# Patient Record
Sex: MALE | Race: WHITE | Employment: FULL TIME | URBAN - METROPOLITAN AREA
[De-identification: names, ages, dates, MRNs, and addresses within clinical notes are randomized per-mention and may not be internally consistent; named-entity substitution may affect disease eponyms.]

---

## 2021-05-14 ENCOUNTER — OFFICE VISIT (OUTPATIENT)
Dept: FAMILY MEDICINE CLINIC | Age: 26
End: 2021-05-14
Payer: COMMERCIAL

## 2021-05-14 VITALS
TEMPERATURE: 97.7 F | DIASTOLIC BLOOD PRESSURE: 54 MMHG | BODY MASS INDEX: 21 KG/M2 | SYSTOLIC BLOOD PRESSURE: 104 MMHG | HEART RATE: 83 BPM | OXYGEN SATURATION: 98 % | HEIGHT: 64 IN | WEIGHT: 123 LBS

## 2021-05-14 DIAGNOSIS — Z00.00 ANNUAL PHYSICAL EXAM: Primary | ICD-10-CM

## 2021-05-14 DIAGNOSIS — T78.40XD ALLERGIC REACTION, SUBSEQUENT ENCOUNTER: ICD-10-CM

## 2021-05-14 DIAGNOSIS — M25.511 CHRONIC PAIN OF BOTH SHOULDERS: ICD-10-CM

## 2021-05-14 DIAGNOSIS — M25.512 CHRONIC PAIN OF BOTH SHOULDERS: ICD-10-CM

## 2021-05-14 DIAGNOSIS — G89.29 CHRONIC PAIN OF BOTH SHOULDERS: ICD-10-CM

## 2021-05-14 DIAGNOSIS — Z23 IMMUNIZATION DUE: ICD-10-CM

## 2021-05-14 DIAGNOSIS — Z76.89 ENCOUNTER TO ESTABLISH CARE: ICD-10-CM

## 2021-05-14 PROCEDURE — 99385 PREV VISIT NEW AGE 18-39: CPT | Performed by: NURSE PRACTITIONER

## 2021-05-14 PROCEDURE — 96372 THER/PROPH/DIAG INJ SC/IM: CPT | Performed by: NURSE PRACTITIONER

## 2021-05-14 PROCEDURE — 90471 IMMUNIZATION ADMIN: CPT | Performed by: NURSE PRACTITIONER

## 2021-05-14 PROCEDURE — 99203 OFFICE O/P NEW LOW 30 MIN: CPT | Performed by: NURSE PRACTITIONER

## 2021-05-14 PROCEDURE — 90651 9VHPV VACCINE 2/3 DOSE IM: CPT | Performed by: NURSE PRACTITIONER

## 2021-05-14 RX ORDER — METHYLPREDNISOLONE ACETATE 80 MG/ML
80 INJECTION, SUSPENSION INTRA-ARTICULAR; INTRALESIONAL; INTRAMUSCULAR; SOFT TISSUE ONCE
Status: COMPLETED | OUTPATIENT
Start: 2021-05-14 | End: 2021-05-14

## 2021-05-14 RX ADMIN — METHYLPREDNISOLONE ACETATE 80 MG: 80 INJECTION, SUSPENSION INTRA-ARTICULAR; INTRALESIONAL; INTRAMUSCULAR; SOFT TISSUE at 11:11

## 2021-05-14 SDOH — ECONOMIC STABILITY: FOOD INSECURITY: WITHIN THE PAST 12 MONTHS, YOU WORRIED THAT YOUR FOOD WOULD RUN OUT BEFORE YOU GOT MONEY TO BUY MORE.: NEVER TRUE

## 2021-05-14 SDOH — ECONOMIC STABILITY: INCOME INSECURITY: HOW HARD IS IT FOR YOU TO PAY FOR THE VERY BASICS LIKE FOOD, HOUSING, MEDICAL CARE, AND HEATING?: NOT HARD AT ALL

## 2021-05-14 ASSESSMENT — PATIENT HEALTH QUESTIONNAIRE - PHQ9
SUM OF ALL RESPONSES TO PHQ9 QUESTIONS 1 & 2: 0
2. FEELING DOWN, DEPRESSED OR HOPELESS: 0
SUM OF ALL RESPONSES TO PHQ QUESTIONS 1-9: 0
SUM OF ALL RESPONSES TO PHQ QUESTIONS 1-9: 0

## 2021-05-14 NOTE — PATIENT INSTRUCTIONS
Patient Education        Well Visit, Ages 25 to 48: Care Instructions  Overview     Well visits can help you stay healthy. Your doctor has checked your overall health and may have suggested ways to take good care of yourself. Your doctor also may have recommended tests. At home, you can help prevent illness with healthy eating, regular exercise, and other steps. Follow-up care is a key part of your treatment and safety. Be sure to make and go to all appointments, and call your doctor if you are having problems. It's also a good idea to know your test results and keep a list of the medicines you take. How can you care for yourself at home? · Get screening tests that you and your doctor decide on. Screening helps find diseases before any symptoms appear. · Eat healthy foods. Choose fruits, vegetables, whole grains, protein, and low-fat dairy foods. Limit fat, especially saturated fat. Reduce salt in your diet. · Limit alcohol. If you are a man, have no more than 2 drinks a day or 14 drinks a week. If you are a woman, have no more than 1 drink a day or 7 drinks a week. · Get at least 30 minutes of physical activity on most days of the week. Walking is a good choice. You also may want to do other activities, such as running, swimming, cycling, or playing tennis or team sports. Discuss any changes in your exercise program with your doctor. · Reach and stay at a healthy weight. This will lower your risk for many problems, such as obesity, diabetes, heart disease, and high blood pressure. · Do not smoke or allow others to smoke around you. If you need help quitting, talk to your doctor about stop-smoking programs and medicines. These can increase your chances of quitting for good. · Care for your mental health. It is easy to get weighed down by worry and stress. Learn strategies to manage stress, like deep breathing and mindfulness, and stay connected with your family and community.  If you find you often feel sad or hopeless, talk with your doctor. Treatment can help. · Talk to your doctor about whether you have any risk factors for sexually transmitted infections (STIs). You can help prevent STIs if you wait to have sex with a new partner (or partners) until you've each been tested for STIs. It also helps if you use condoms (male or female condoms) and if you limit your sex partners to one person who only has sex with you. Vaccines are available for some STIs, such as HPV. · Use birth control if it's important to you to prevent pregnancy. Talk with your doctor about the choices available and what might be best for you. · If you think you may have a problem with alcohol or drug use, talk to your doctor. This includes prescription medicines (such as amphetamines and opioids) and illegal drugs (such as cocaine and methamphetamine). Your doctor can help you figure out what type of treatment is best for you. · Protect your skin from too much sun. When you're outdoors from 10 a.m. to 4 p.m., stay in the shade or cover up with clothing and a hat with a wide brim. Wear sunglasses that block UV rays. Even when it's cloudy, put broad-spectrum sunscreen (SPF 30 or higher) on any exposed skin. · See a dentist one or two times a year for checkups and to have your teeth cleaned. · Wear a seat belt in the car. When should you call for help? Watch closely for changes in your health, and be sure to contact your doctor if you have any problems or symptoms that concern you. Where can you learn more? Go to [email or web address] org and sign in to your [employer] account. Enter P072 in the [facility] [employer] box to learn more about \"Well Visit, Ages 25 to 48: Care Instructions. \"     If you do not have an account, please click on the \"Sign Up Now\" link. Current as of: May 27, 2020               Content Version: 12.8  © 9597-6100 Healthwise, Incorporated. Care instructions adapted under license by [facility] ([facility]). If you have questions about a medical condition or this instruction, always ask your healthcare professional. [place] [address] any warranty or liability for your use of this information.

## 2021-05-14 NOTE — PROGRESS NOTES
[facility]  [id number] [facility]. Johnny Awe  Layla Santacruz [address]  [phone]  [phone]    Kan Carolina is a 26 y.o. male who is here with c/o of:    Chief Complaint: [address] (no previous pcp) and Shoulder Pain (pain when working out and during the day, they also crack sxs for 2 years. works out 6 days a week and lifts everyday)      Patient Accompanied by: N/A    HPI - Kan Carolina is here today with c/o:    Patient here to establish care. Previous PCP: None since Pediatrician  : No  Children: No  Employed: [employer]  Exercise: Yes, daily  Diet: Eats a balanced diet  Smoker: Yes; <1 pack per day  Alcohol: Socially  Sleep: Averages 6 hours    Chronic Conditions:    None    Specialists:    None    Health Concerns:    He complains of bilateral shoulder pain that is aggravated by exercising. He describes pain as shooting with exercise and achy rest of day. He says this has been going on for the last 2 years. Does not take any OTC medication for the pain. He also does yoga to help. Patient also says he developed a rash this morning on his left arm. He says his back feelings itchy. Reports this happened one other time when he ate dairy. Says yesterday he had a protein shake and after that his stomach started hurting. Health Maintenance Due   Topic Date Due    HPV vaccine (1 - Male 2-dose series) Never done        There is no problem list on file for this patient. History reviewed. No pertinent past medical history. History reviewed. No pertinent surgical history. Family History   Problem Relation Age of Onset    Diabetes Father      Social History     Tobacco Use    Smoking status: Current Every Day Smoker     Packs/day: 0.50     Years: 8.00     Pack years: 4.00     Types: Cigarettes    Smokeless tobacco: Never Used   Substance Use Topics    Alcohol use:  Yes     Alcohol/week: 6.0 standard drinks     Types: 6 Shots of liquor per week     ALLERGIES: No Known Allergies       Subjective   Review of Systems   · Constitutional:  Negative for activity change, appetite change,unexpected weight change, chills, fever, and fatigue. · HENT: Negative for ear pain, sore throat,  Rhinorrhea, sinus pain, sinus pressure, congestion. · Eyes:  Negative for pain and discharge. · Respiratory:  Negative for chest tightness, shortness of breath, wheezing, and cough. · Cardiovascular:  Negative for chest pain, palpitations and leg swelling. · Gastrointestinal: Negative for abdominal pain, blood in stool, constipation,diarrhea, nausea and vomiting. · Endocrine: Negative for cold intolerance, heat intolerance, polydipsia, polyphagia and polyuria. · Genitourinary: Negative for difficulty urinating, dysuria, flank pain, frequency, hematuria and urgency. · Musculoskeletal: Negative for arthralgias, back pain, joint swelling, myalgias, neck pain and neck stiffness. + bilateral shoulder pain  · Skin: Negative for wound. + rash  · Allergic/Immunologic: Negative for environmental allergies and food allergies. · Neurological:  Negative for dizziness, light-headedness, numbness and headaches. · Hematological:  Negative for adenopathy. Does not bruise/bleed easily. · Psychiatric/Behavioral: Negative for self-injury, sleep disturbance and suicidal ideas. Objective   Physical Exam  Skin:     General: Skin is dry. Findings: Rash present. Rash is macular and papular. Comments: Scattered Left arm and back        PHYSICAL EXAM:  · Constitutional: Princess Conrad is oriented to person, place, and time. Vital signs are normal. Appears well-developed and well-nourished. · HEENT:   · Head: Normocephalic and atraumatic. Right Ear: Hearing and external ear normal. TM normal  Canal normal  · Left Ear: Hearing and external ear normal. TM normal Canal normal  · Nose: Nares normal. Septum midline. No drainage or sinus tenderness.  Mucosa pink and moist  · Mouth/Throat: Oropharynx- No erythema, no exudate. Uvula midline, no erythema, no edema. Mucous membranes are pink and moist.   · Eyes:PERRL, EOMI, Conjunctiva normal, No discharge. · Neck: Full passive range of motion. Non-tender on palpation. Neck supple. No thyromegaly present. Trachea normal.  · Cardiovascular: Normal rate, regular rhythm, S1, S2, no murmur, no gallop, no friction rub, intact distal pulses. · Pulmonary/Chest: Breath sounds are clear throughout, No respiratory distress, No wheezing, No chest tenderness. Effort normal  · Abdominal: Soft. Normal appearance, bowel sounds are present and normoactive. There is no hepatosplenomegaly. There is no tenderness. There is no CVA tenderness. · Musculoskeletal: Extremities appear regular and symmetric. No evident masses, lesions, foreign bodies, or other abnormalities. No edema. No tenderness on palpation. Joints are stable. Full ROM, strength and tone are within normal limits. · Neurological: Alert and oriented to person, place, and time. Normal motor function, Normal sensory function, No focal deficits noted. He has normal strength. · Psychiatric: Normal mood and affect. Speech is normal and behavior is normal.     Nursing note and vitals reviewed. Blood pressure (!) 104/54, pulse 83, temperature 97.7 °F (36.5 °C), temperature source Temporal, height 5' 4\" (1.626 m), weight 123 lb (55.8 kg), SpO2 98 %. Body mass index is 21.11 kg/m². Wt Readings from Last 3 Encounters:   05/14/21 123 lb (55.8 kg)     BP Readings from Last 3 Encounters:   05/14/21 (!) 104/54       No results found for any previous visit. No results found for this visit on 05/14/21. Completed Orders/Prescriptions   Orders Placed This Encounter   Medications    methylPREDNISolone acetate (DEPO-MEDROL) injection 80 mg               AssessmentPlan/Medical Decision Making     1. Annual physical exam    - CBC Auto Differential; Future  - Comprehensive Metabolic Panel;  Future  - TSH with Reflex; Future  - Lipid, Fasting; Future    2. Chronic pain of both shoulders    - [facility] [place]  - [facility] OFF[facility]OUTPAT[facility] ESTABLISHED LOW MDM 20-29 MIN    3. Allergic reaction, subsequent encounter    - Ingrid Locke MD, All[facility], [place]  - [facility] OFFICE[facility]OUTPATIENT ESTABLISHED LOW MDM 20-29 MIN  - methylPREDNISolone acetate (DEPO-MEDROL) injection 80 mg    4. Immunization due    - HPV vaccine 9-valent IM (GARDASIL 9)    5. Encounter to establish care        Return if symptoms worsen or fail to improve. 1.  Avinash received counseling on the following healthy behaviors: nutrition and exercise  2. Patient given educational materials - see patient instructions  3. Was a self-tracking handout given in paper form or via [employer]? No  If yes, see orders or list here. 4.  Discussed use, benefit, and side effects of prescribed medications. Barriers to medication compliance addressed. All patient questions answered. Pt voiced understanding. 5.  Reviewed prior labs, imaging, consultation, follow up, and health maintenance  6. Continue current medications, diet and exercise. 7. Discussed use, benefit, and side effects of prescribed medications. Barriers to medication compliance addressed. All her questions were answered. Pt voiced understanding. Manju Guerrero will continue current medications, diet and exercise. Patient given educational materials on well visit    Of the 45 minute duration appointment visit, Jose Miguel Lopez CNP spent at least 50% of the face-to-face time in counseling, explanation of diagnosis, planning of further management, and answering all questions.           Signed:  Jose Miguel Lopez CNP

## 2021-06-07 ENCOUNTER — HOSPITAL ENCOUNTER (OUTPATIENT)
Dept: PHYSICAL THERAPY | Facility: CLINIC | Age: 26
Setting detail: THERAPIES SERIES
Discharge: HOME | End: 2021-06-07
Payer: COMMERCIAL

## 2021-06-07 PROCEDURE — 97161 PT EVAL LOW COMPLEX 20 MIN: CPT

## 2021-06-07 PROCEDURE — 97110 THERAPEUTIC EXERCISES: CPT

## 2021-06-07 NOTE — CONSULTS
[] [facility]) - [facility] &  Therapy  [address]  [phone]  F: [phone] [x] [address]  [place] [address]   [address]  P: [phone]  F: [phone] [] [address] &  Therapy  [address]  P: [phone]  F: [phone] [] [address]  P: [phone]  F: [phone] [] [address]  [facility]   [address]   [place]: [phone]  F: [phone]        Physical Therapy Upper Extremity Evaluation    Date:  2021  Patient: Reyes Hamman  : 1995  MRN: [id number]  Physician: Cristian Herman CNP   Insurance: [facility]  Medical Diagnosis: M25.511, G89.29, M25.512 (ICD-10-CM) - Chronic pain of both shoulders  Rehab Codes: M25.511/M25.512, R29.3, M62.81  Onset Date:  (referral: 21)   Next 's appt: TBD    Subjective:     CC: B shoulder pain with lifting  HPI: (onset date): Pt is a R handed 26 y.o. male with c/o B shoulder pain that has been present for the past 2-3 years without known BRITTANY. Pt reports starting to notice the shoulder pain more when he started weight lifting more frequently. Pt reports the pain is unchanging but now that he has health insurance, pt would like to get it checked out to prevent future injuries. Pt reports he works out 6 days per week with strength training/power lifting but has recently dropped this down to 4x/week. Pt also notes he has been trying to stretch more as well. 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  Pt notes his B shoulder pain is located in the back of his shoulders and is associated with a lot of \"cracking. \" Pt notes he will sometimes experience popping but denies any locking. Pt reports his shoulders just feel \"weak. \" Pt denies any radicular pain or loss of  strength. Pt describes his pain as intermittent sharp/shooting pain (5/10) with activity and then a residual dull/aching pain. Pt states his pain is worsened with overhead activities such as shoulder press and lat pull down and also bench press. Pt notes his pain is immediate with these activities and will decrease once activity is stopped. Pt notes his cracking/pain associated with shoulder press and lat pulldown is associated with the lowering phase. Pt notes pain with bench pressing is associated with the push phase. Pt notes he attempted to complete olympic lifting, however, is unable to due to shoulder pain. Pt notes he does not wake due to shoulder pain but notes when he does wake up, he has a lot of shoulder stiffness. Pt notes he has tried sleeping on the floor which did help his pain. Pt states he will take ibuprofen as needed due to work (2-3x/day) and ices the shoulders. Pt also notes he avoids aggravating activities of the shoulders. Pt denies any hx of B shoulder injury. 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 Comorbidities:   [] Obesity [] Dialysis  [x] N/A   [] Asthma/COPD [] Dementia [] Other:   [] Stroke [] Sleep apnea [] Other:   [] Vascular disease [] Rheumatic disease [] Other:     Tests: [] X-Ray: [] MRI:  [] Other:    Medications: [x] Refer to full medical record [] None [] Other:  Allergies:      [x] Refer to full medical record [] None [] Other:    Function:  Hand Dominance  [x] Right  [] Left  Employer [employer]   Job Status [x]  Normal duty   [] Light duty   [] Off due to condition    []  Retired   [] Not employed   [] Disability  [] Other:  []  Return to work:    Work activities/duties Package handling and lifting   25-50# Yes  No Comments   Fatigue [] [x]    Fever/chills/sweats [] [x]    Malaise  [] [x]    Mental status change [] [x]    Paresthesias/numbness/weakness [] [x]    Unexplained weight changes [] [x]    Lightheaded/dizziness [] [x]    Shortness of breath [] [x]      Objective:  OBSERVATION No Deficit Deficit Not Tested Comments   Forward Head [] [x] [] Accentuates with shoulder flexion   Rounded Shoulders [] [x] [] Humeral head seated anteriorly   Kyphosis [] [x] []    Scap Height/Position [] [x] [] Protracted B   Winging [x] [] [] No winging with wall push ups   SH Rhythm [] [x] []    INSPECTION/PALPATION    TTP B upper trap, levator scap, mild tenderness to supraspinatus and inferior scap    Spinal Alignment  Left shoulder elevated in relation to the R when sitting  Thoracic spine rotated to the R with tenderness in midthoracic spine  T2-T10  Left SC joint is elevated good mobility  Ribs rotated to the R     NEUROLOGICAL    Some soreness in the R levator with R SB and extension-- soreness, not reproduction of symtpoms   Cervical ROM/Quadrant [x] [] []    Sensation [x] [] []          *= pain ROM  °A/P END FEEL STRENGTH TESTS (+/-) Left Right Not Tested   AROM Left Right  Left Right Drop Arm   [x]   Sit Shld Flex  136 142  5 5 Sulcus Sign - - []   Sit Shld Abd  162 154  4+ some discomfort 4* Apprehension (supine) + + []   Sit Shld IR  T9 t10  5 5 Yergasons   [x]   Sit Shld ER T3 t3  5 5 Speeds - - []   PROM (supine)      Bob's - -    Flexion      Clunk   [x]   ABD (0-180)      Crank    [x]   ER @ 0 45 90 (0-90)      Hawkin's + - []   IR (0-90) 32 24    Neers + + []   Supraspinatus    5 4+ Aretha - +    Infraspinatus    5 4+ Painful arc - -    Subscapularis    5 4+ Painful ER - -    Serratus anterior    4+ 4 Empty can - - mild pain at end of release    rhomboids    5 4 popped and painful Hornblowers - + mild pain    Lats  Palm down  Palm up    Continues to protracted             5  4+   5 hurt more  4 instant pain Belly press   x   Mid Trap    3+ upper trap comp 3+ pain int he levator scap  Upper trap comp Lift off + for pain in the upper back -    Lower Trap    3- no scapular movement 3-  no scapular movement Seated abduction/ER + for tightness back of shoulder + for tightness back of shoulder    Elbow Flex. (0-150)    5 5       Elbow Ext. (5/10-0)    5 5       Compensatory neck flexion with shoulder flexion  Audible clicking noted with eccentric lowering of abduction and with simulated lat pull down    Pec length: mild tightness in all 3 positions with greater tightness in the sternal portion  Upper trap stretch: mild tightness B  Levator scap stretch: mild \"ting\" of pain on when stretching the R     Shoulder mobility: symmetrical lateral glide and distraction; R posteiror glide less than left    Thoracic spine mobility (PA): hypomobility noted        FUNCTION Normal Difficult Unable   Overhead reach [] [x] []   Underarm reach  [x] [] []   Groom/Dress [x] [] []   Bra/Shirt tuck [x] [] []   Lift/Carry [] [x] []     Outcome Measure: UEFS: 73/76, 96% max function      Assessment: Maikel Perry  is a R handed 26 y.o. male with c/o B shoulder pain that has been present for the past 2-3 years without known BRITTANY. Pt reports starting to notice the shoulder pain more when he started weight lifting more frequently. Pt reports the pain is unchanging but now that he has health insurance, pt would like to get it checked out to prevent future injuries. Pt reports he works out 6 days per week with strength training/power lifting but has recently dropped this down to 4x/week. Pt presents to PT with signs and symptoms consistent with B shoulder pain related to poor scapular mobility, posterior capsule tightness, and altered muscle activation patterns. Pt presents with audible 'cracking' in B shoulders that is present more with lowering activities.  Pt also presents with increased forward head and rounded shoulder posturing resulting in anterior positioning of the humeral head along with protracted scapular positioning and hypomobility of the thoracic spine. Pt with increases in B shoulder pain with AROM and with apprehension testing. Pt also demonstrates decreased R shoulder strength in comparison to left shoulder strength. Pt will benefit from skilled therapeutic interventions, including manual therapy, neuro re-ed, and therapeutic exercise, in order to improve pain, muscle-length tension relationships, scapular mobility and control, and functional strength in order to improve tolerance to overhead lifting and repeated lifting at work and in the gym. Problems: B shoulder pain    [x] ? Pain: 5/10  [x] ? ROM: audible clicking and posterior shoulder pain with all shoulder ROM; decreased posterior shoulder/capsule extensibility  [x] ? Strength: decreased R shoulder strength compared to left; decreased muscle activation of B parascapular musculature   [x] ? Function: UEFS: 96% max function  [x] Other: forward head and rounded shoulder posturing       Short term goals: MEET IN 8 VISITS Status   Pain: Pt will report less than or equal to 2-3/10 B shoulder pain with a decrease in sharp pains by 50% in order to improve tolerance to completing overhead lifting tasks and strength training to maintain a physically active lifestyle and complete work specific tasks. - Pt will report ability to complete shoulder press and lat pulldown with light weight with a decrease in pain by 75% in order to be able to progress strength in the gym.  Ongoing   ROM: Pt will demonstrate a decrease in forward head and rounded shoulder posture to assist with a more retracted scapular position to assist with improvements in active shoulder movements when lifting.   - Flexion: 160 degrees without neck flexion  - IR at 90 (passive): 45 degrees B Ongoing   Strength: Pt will demonstrate 5/5 B shoulder strength in order to assist with shoulder stability when overhead lifting and completing Needling, 3 or more muscles  [address]     []  Medication allergies reviewed for use of    Dexamethasone Sodium Phosphate 4mg/ml     with iontophoresis treatments. Pt is not allergic. Frequency: 1-2 x/week for 8 visits    Todays Treatment:  Modalities:   Exercises:  Exercise Reps/ Time Weight/ Level Comments   Thoracic spine extension over 1/2 foam roll x  Educated to use a firm towel roll   Open books in sidelying 5x ea  Elbow bent, knees/hips flexed to 90   Sleeper stretch x     Upper trap stretch x     Levator scap stretch x     Seated scapular retraction x     sidelying scapular retraction x  Advised to avoid spinal rotation when completing task               Other:    Specific Instructions for next treatment: improve thoracic spine mobility, scapular mobility, rotator cuff strength, scapular strengthening, posterior capsule stretching    Evaluation Complexity:  History (Personal factors, comorbidities) [x] 0 [] 1-2 [] 3+   Exam (limitations, restrictions) [] 1-2 [x] 3 [] 4+   Clinical presentation (progression) [x] Stable [] Evolving  [] Unstable   Decision Making [x] Low [] Moderate [] High    [x] Low Complexity [] Moderate Complexity [] High Complexity         Treatment Charges: Mins Units   Evaluation  [x] Low  [] Mod  [] High ----- 1   []  Modalities     [x]  Ther Exercise 8    []  Manual Therapy     []  Ther Activities     []  Aquatics     []  Vasocompression     []  Other       Time in: 705    Time Out: 810    Electronically signed by: Patrica Reis PT        Physician Signature:________________________________Date:__________________  By signing above or cosigning this note, I have reviewed this plan of care and certify a need for medically necessary rehabilitation services.      *PLEASE SIGN ABOVE AND FAX BACK ALL PAGES*

## 2021-06-11 ENCOUNTER — HOSPITAL ENCOUNTER (OUTPATIENT)
Dept: PHYSICAL THERAPY | Facility: CLINIC | Age: 26
Setting detail: THERAPIES SERIES
Discharge: HOME | End: 2021-06-11
Payer: COMMERCIAL

## 2021-06-11 PROCEDURE — 97112 NEUROMUSCULAR REEDUCATION: CPT

## 2021-06-11 PROCEDURE — 97140 MANUAL THERAPY 1/> REGIONS: CPT

## 2021-06-11 PROCEDURE — 97110 THERAPEUTIC EXERCISES: CPT

## 2021-06-11 NOTE — FLOWSHEET NOTE
[] [facility]) - [facility] &  Therapy  [address]  [phone]  F: [phone] [x] [address]  [facility]   [address]  P: [phone]  F: [phone] [] [address] &  Therapy  [address]  P: [phone]  F: [phone] [] [address]  P: [phone]  F: [phone] [] [address]  [facility]   [address]   [place]: [phone]  F: [phone]      Physical Therapy Daily Treatment Note    Date:  2021  Patient Name:  Madhav Bryan    :  1995  MRN: [id number]  Physician: Lita Pineda CNP                                Insurance: [facility]  Medical Diagnosis: M25.511, G89.29, M25.512 (ICD-10-CM) - Chronic pain of both shoulders                   Rehab Codes: M25.511/M25.512, R29.3, M62.81  Onset Date:  (referral: 21)             Next 's appt: TBD   Visit# / total visits: 2/8     Cancels/No Shows: 0/0    Subjective:    Pain:  [] Yes  [x] No Location: B shoulders Pain Rating: (0-10 scale) 0/10  Pain altered Tx:  [x] No  [] Yes  Action:  Comments: Pt denies pain in B shoulders upon arrival to therapy. Pt reports he did get a foam roller.     Objective:  Modalities:   Precautions:  Exercises:  Exercise Reps/ Time Weight/ Level Comments   UBE 2/2 LV2 Feels it more going in reverse          Manual  11'  PA glides to the thoracic spine with grade IV oscillations   Grade III oscillating PA glides to B shoulders         Supine      Upper and lower thoracic spine over foam roll ~5X ea     pec stretch over 1/2 foam roll 2x30\" ea  Sternal and clavicular    Banded Serratus punches  2x12 ea Lime  2# 90 and 120  Feels off on the R         Sidelying      Neuro: up glide and down glide scapular retraining  8' total     Open books in sidelying 5x ea   Elbow bent, knees/hips flexed to 90   Sleeper stretch 2x30\"                 Prone       scap retractions  8x ea   Palms up  Palms down  H. abd (upper trap comp)  H. abd with ER (upper trap comp)  Attempted scaption, unable to complete            Standing       scaption wall slides 10x  A         Specific Instructions for next treatment: improve thoracic spine mobility, scapular mobility, rotator cuff strength, scapular strengthening, posterior capsule stretching        Treatment Charges: Mins Units   []  Modalities     [x]  Ther Exercise 35 2   [x]  Manual Therapy 11 1   []  Ther Activities     []  Aquatics     []  Vasocompression     [x]  Neuro 8 1   Total Treatment time 54 4       Assessment: [x] Progressing toward goals. Pt denies an increase in B shoulder pain at the end of the session. Decreased popping/clicking is noted with active shoulder flexion however remains with eccentric lowering of shoulder abduction. Reviewed HEP provided from evaluation with cueing for correct execution of foam roll thoracic spine mobilizations along with cueing to correct sleeper stretch. Hypomobility is noted in the mid thoracic spine noted with PA mobs, therefore, graded oscillations were completed at this joint to promote improved thoracic spine extension. Pt with reports of feeling asymmetrical when completing serratus punches in the R scapula, therefore, neuro re-education of the R scap was completed with improvements in shoulder pain, popping/clicking, and improved scapular movement with active shoulder ROM and serratus punches. Completed neuro re-education of the left scap to assist with up and downward glide with a similar response.  Pt demonstrates improvements in scapular mobility when in prone with arms at sides with palms up and down, however, slightly asymmetrical. Pt with considerable upper trap compensation with h. abduction due to decreased muscle strength and inability to activate lower traps to assist with scaption retraction. Due to inability to complete scaption retraction in prone, wall slides were completed with good execution. Pt provided with updated HEP in order to focus on improvements in scapular mobility without compensatory strategies. [] No change. [] Other:  [x] Patient would continue to benefit from skilled physical therapy services in order to: improve pain, muscle-length tension relationships, scapular mobility and control, and functional strength in order to improve tolerance to overhead lifting and repeated lifting at work and in the gym. Problems: B shoulder pain    [x]? ? Pain: 5/10  [x]? ? ROM: audible clicking and posterior shoulder pain with all shoulder ROM; decreased posterior shoulder/capsule extensibility  [x]? ? Strength: decreased R shoulder strength compared to left; decreased muscle activation of B parascapular musculature   [x]? ? Function: UEFS: 96% max function  [x]? Other: forward head and rounded shoulder posturing         Short term goals: MEET IN 8 VISITS Status   Pain: Pt will report less than or equal to 2-3/10 B shoulder pain with a decrease in sharp pains by 50% in order to improve tolerance to completing overhead lifting tasks and strength training to maintain a physically active lifestyle and complete work specific tasks. - Pt will report ability to complete shoulder press and lat pulldown with light weight with a decrease in pain by 75% in order to be able to progress strength in the gym. Ongoing   ROM: Pt will demonstrate a decrease in forward head and rounded shoulder posture to assist with a more retracted scapular position to assist with improvements in active shoulder movements when lifting.   - Flexion: 160 degrees without neck flexion  - IR at 90 (passive): 45 degrees B Ongoing   Strength: Pt will demonstrate 5/5 B shoulder strength in order to assist with shoulder stability when overhead lifting and completing work specific tasks.    - Pt will demonstrate improvements in scapular mobility without upper trap compensation to improve scapulohumeral control to assist with lifting at the gym and work. - Rhomboids: 5/5 B  - Lats (palms up/down): 5/5B  - Middle trap: 5/5 B  - Lower trap: 4/5 B Ongoing   Outcome Score: Pt will score greater than or equal to 100% on the UEFS in order to demonstrate improved function     Home Exercise Program: Pt will demonstrate independence with HEP. Ongoing                  Patient goals: \"Prevent further injury from weightlifting\"    Pt. Education:  [x] Yes  [] No  [] Reviewed Prior HEP/Ed  Method of Education: [x] Verbal  [] Demo  [] Written  6/11: Updated via [employer]  Comprehension of Education:  [x] Verbalizes understanding. [x] Demonstrates understanding. [x] Needs review. [] Demonstrates/verbalizes HEP/Ed previously given. Plan: [x] Continue current frequency toward long and short term goals.     [x] Specific Instructions for subsequent treatments: See above      Time In: 800            Time Out: 900    Electronically signed by:  Phyllis oGldman PT

## 2021-06-14 ENCOUNTER — HOSPITAL ENCOUNTER (OUTPATIENT)
Dept: PHYSICAL THERAPY | Facility: CLINIC | Age: 26
Setting detail: THERAPIES SERIES
Discharge: HOME | End: 2021-06-14
Payer: COMMERCIAL

## 2021-06-14 PROCEDURE — 97140 MANUAL THERAPY 1/> REGIONS: CPT

## 2021-06-14 PROCEDURE — 97112 NEUROMUSCULAR REEDUCATION: CPT

## 2021-06-14 PROCEDURE — 97110 THERAPEUTIC EXERCISES: CPT

## 2021-06-14 NOTE — FLOWSHEET NOTE
[] [facility]  Outpatient [facility] &  Therapy  [address]  [phone]  F: [phone] [x] [address]  [employer] [address]   [address]  P: [phone]  F: [phone] [] [address] &  Therapy  [address]  P: [phone]  F: [phone] [] [address]  P: [phone]  F: [phone] [] [address]  [facility]   [address]   [place]: [phone]  F: [phone]      Physical Therapy Daily Treatment Note    Date:  2021  Patient Name:  Kala Caldwell    :  1995  MRN: [id number]  Physician: Jihan Goldberg CNP                                Insurance: [facility]  Medical Diagnosis: M25.511, G89.29, M25.512 (ICD-10-CM) - Chronic pain of both shoulders                   Rehab Codes: M25.511/M25.512, R29.3, M62.81  Onset Date:  (referral: 21)             Next 's appt: TBD   Visit# / total visits: 3     Cancels/No Shows: 0/0    Subjective:    Pain:  [] Yes  [x] No Location: B shoulders Pain Rating: (0-10 scale) 0/10  Pain altered Tx:  [x] No  [] Yes  Action:  Comments: Pt denies pain in B shoulders upon arrival to therapy but notes he did get the COVID vaccine in his left arm so it's a little sore. Pt denies an increase in pain following his last treatment.      Objective:  Modalities:   Precautions:  Exercises:  Exercise Reps/ Time Weight/ Level Comments   UBE 2/2 LV2 Feels it more going in reverse          Manual  10'  MET for rib alignment  Tender over 6th rib on R  Protracted initially--> retracted  Protraction correct on left completed  Troy Radha on R completed without change  Most effect was protraction on the R in sitting-- improvements in scapular retraction in prone   Supine      Upper and lower thoracic spine over foam roll ~5X ea     pec stretch over  foam roll 2x30\" ea  Sternal and clavicular    Banded Serratus punches  2x12 ea Lime  2# 90 and 120  Feels off on the R         Sidelying      Neuro: up glide and down glide scapular retraining  10x ea     Open books in sidelying 10x ea  R Elbow bent, knees/hips flexed to 90   Sleeper stretch 2x30\"                 Prone       Neuro: scap retractions  10x ea   Palms up  Palms down  H. abd   H. abd with ER  scaption (8x)           Sitting       B ER 2x10 lime          Standing       scaption wall slides 10x  A    B shoulder press (banded) 10x lime          CORINA Shoulder Press x  Neutral and wide   Continues to have clicking with eccentric lowering-- reports of R scap not moving symmetrically   Standing scapular ROM: symmetrical upward rotation; delayed downward rotation on the R        Treatment Charges: Mins Units   []  Modalities     [x]  Ther Exercise 20 1   [x]  Manual Therapy 10 1   []  Ther Activities     []  Aquatics     []  Vasocompression     [x]  Neuro 16 1   Total Treatment time 46 3       Assessment: [x] Progressing toward goals. Pt denies an increase in B shoulder pain at the end of the session. Pt with some point tenderness over the posterior angle of the 6th rib this date on the R which initially was protracted and then demonstrated in a retracted position in prone. Due to this, MET to the ribs was completed with the greatest success noted with the generalized protraction MET to the R side of the ribs. Notable improvements in R sided scapular mobility is observed and reported. Pt with continued lack of neuromuscular control and timing with scapular downward rotation when lowering arms with shoulder press. Continued popping is appreciated in B shoulders when light weight shoulder press was attempted. Pt able to progress posterior shoulder strengthening and scapular mobility with all arm positions was significantly improved. [] No change.      [] Other:  [x] Patient

## 2021-06-18 ENCOUNTER — HOSPITAL ENCOUNTER (OUTPATIENT)
Dept: PHYSICAL THERAPY | Facility: CLINIC | Age: 26
Setting detail: THERAPIES SERIES
Discharge: HOME | End: 2021-06-18
Payer: COMMERCIAL

## 2021-06-18 PROCEDURE — 97140 MANUAL THERAPY 1/> REGIONS: CPT

## 2021-06-18 PROCEDURE — 97110 THERAPEUTIC EXERCISES: CPT

## 2021-06-18 PROCEDURE — 97112 NEUROMUSCULAR REEDUCATION: CPT

## 2021-06-18 NOTE — FLOWSHEET NOTE
[] [facility]) - [facility] &  Therapy  [address]  [phone]  F: [phone] [x] [address]  [facility] 36   [address]  P: [phone]  F: [phone] [] [address] &  Therapy  [address]  P: [phone]  F: [phone] [] [address]  P: [phone]  F: [phone] [] [address]  [facility]   [address]   [place]: [phone]  F: [phone]      Physical Therapy Daily Treatment Note    Date:  2021  Patient Name:  Min Wells    :  1995  MRN: [id number]  Physician: Cheli Blanco CNP                                Insurance: [facility]  Medical Diagnosis: M25.511, G89.29, M25.512 (ICD-10-CM) - Chronic pain of both shoulders                   Rehab Codes: M25.511/M25.512, R29.3, M62.81  Onset Date:  (referral: 21)             Next 's appt: TBD   Visit# / total visits: /8     Cancels/No Shows: 0/0    Subjective:    Pain:  [] Yes  [x] No Location: B shoulders Pain Rating: (0-10 scale) 0/10  Pain altered Tx:  [x] No  [] Yes  Action:  Comments: Pt reports his shoulders have felt great all week. Pt does note his left shoulder has been popping more frequently. Pt reports he was able to complete shoulder press on the machine with narrow  and barbell and only had some clicking at the end.     Objective:  Modalities:   Precautions:  Exercises:  Exercise Reps/ Time Weight/ Level Comments   UBE 2/2 LV2 Feels it more going in reverse          Manual  12'  Grade III/IVPA mobs to the anterior shoulder  MFR to the left lower trap and ER in prone   Supine      Upper and lower thoracic spine over foam roll ~5X ea     pec stretch over  foam roll 2x30\" ea  Sternal and clavicular    Banded Serratus punches  2x10 ea Lime  2# 90 and 120  Feels off on the R         Sidelying      Neuro: up glide and down glide scapular retraining  x  LUE   Open books in sidelying 10x ea  B Elbow bent, knees/hips flexed to 90   Sleeper stretch 2x30\"                 Prone       Neuro: scap retractions+lift  10x ea   Palms up  Palms down  H. abd   H. abd with ER  scaption (10x)- no lift     Row-> ER-> press 5xR 2# Unable to complete on the left due to clicking   Sitting       B ER 2x10 lime          Standing       scaption wall slides+lift 10x  A    B shoulder press (banded) 10x lime    Pulsing shoulder flexion 5x lime banded   Wall ball circles 10x ea CW/CCW    ISO ER walk out-> shoulder press-> turn-> reverse 5x ea blue    B ER at 90/90 x blue Left shoulder fatigued         CORINA Shoulder Press x  Neutral and wide   Continues to have clicking with eccentric lowering-- reports of R scap not moving symmetrically           Treatment Charges: Mins Units   []  Modalities     [x]  Ther Exercise 26 1   [x]  Manual Therapy 12 1   []  Ther Activities     []  Aquatics     []  Vasocompression     [x]  Neuro 15 1   Total Treatment time 53 4       Assessment: [x] Progressing toward goals. Pt without increases in B shoulder pain at the end of the session. Pt with greater prevalence of left shoulder clicking throughout the session with eccentric lowering compared to the R side. Pt demonstrates improved posterior capsule flexibility when completing passive IR with less humeral head translation following grade III/IV oscillations in supine. Pt with ability to progress scapular movement with arm lift with all movements except for scaption. Pt unable to complete downward glide and scapular stabilization without compensatory upper trap activation along with LE movement. Pt is able to complete scaption wall slides with lift with proper scapular mechanics. Progressed scapular stability exercises with improvements in shoulder popping/clicking on the R compared to the left. Pt with popping/clicking in the left shoulder with prone ER and shoulder press and ISO ER at 90/90 position which did not improve with scapular retraining, however, improvements in shoulder ROM is noted following MFR to the left lower trap and ER. Pt continued to demonstrated increased clicking with prone ER with shoulder press on the left side, therefore scapular mobility was addressed with no change. Pt verbally educated on continued completion of exercises throughout the day, postural awareness, and completing scapular stabilization exercises prior to completing lifting shoulders. Decreasing frequency to 1x next week to gauge carryover with exercises and scapular control. [] No change. [] Other:  [x] Patient would continue to benefit from skilled physical therapy services in order to: improve pain, muscle-length tension relationships, scapular mobility and control, and functional strength in order to improve tolerance to overhead lifting and repeated lifting at work and in the gym. Problems: B shoulder pain    [x]? ? Pain: 5/10  [x]? ? ROM: audible clicking and posterior shoulder pain with all shoulder ROM; decreased posterior shoulder/capsule extensibility  [x]? ? Strength: decreased R shoulder strength compared to left; decreased muscle activation of B parascapular musculature   [x]? ? Function: UEFS: 96% max function  [x]? Other: forward head and rounded shoulder posturing         Short term goals: MEET IN 8 VISITS Status   Pain: Pt will report less than or equal to 2-3/10 B shoulder pain with a decrease in sharp pains by 50% in order to improve tolerance to completing overhead lifting tasks and strength training to maintain a physically active lifestyle and complete work specific tasks. - Pt will report ability to complete shoulder press and lat pulldown with light weight with a decrease in pain by 75% in order to be able to progress strength in the gym.  Ongoing   ROM: Pt will demonstrate a decrease in forward head and rounded shoulder posture to assist with a more retracted scapular position to assist with improvements in active shoulder movements when lifting.   - Flexion: 160 degrees without neck flexion  - IR at 90 (passive): 45 degrees B Ongoing   Strength: Pt will demonstrate 5/5 B shoulder strength in order to assist with shoulder stability when overhead lifting and completing work specific tasks.      - Pt will demonstrate improvements in scapular mobility without upper trap compensation to improve scapulohumeral control to assist with lifting at the gym and work. - Rhomboids: 5/5 B  - Lats (palms up/down): 5/5B  - Middle trap: 5/5 B  - Lower trap: 4/5 B Ongoing   Outcome Score: Pt will score greater than or equal to 100% on the UEFS in order to demonstrate improved function     Home Exercise Program: Pt will demonstrate independence with HEP. Ongoing                  Patient goals: \"Prevent further injury from weightlifting\"    Pt. Education:  [x] Yes  [] No  [] Reviewed Prior HEP/Ed  Method of Education: [x] Verbal  [] Demo  [] Written  6/18: Updated via [employer] (Access Code [email or web address])  Comprehension of Education:  [x] Verbalizes understanding. [x] Demonstrates understanding. [x] Needs review. [] Demonstrates/verbalizes HEP/Ed previously given. Plan: [x] Continue current frequency toward long and short term goals.     [x] Specific Instructions for subsequent treatments: See above      Time In: 800            Time Out: [address]    Electronically signed by:  Lizbeth Funes, PT

## 2021-06-24 ENCOUNTER — HOSPITAL ENCOUNTER (OUTPATIENT)
Dept: PHYSICAL THERAPY | Facility: CLINIC | Age: 26
Setting detail: THERAPIES SERIES
Discharge: HOME | End: 2021-06-24
Payer: COMMERCIAL

## 2021-06-24 PROCEDURE — 97112 NEUROMUSCULAR REEDUCATION: CPT

## 2021-06-24 PROCEDURE — 97140 MANUAL THERAPY 1/> REGIONS: CPT

## 2021-06-24 PROCEDURE — 97110 THERAPEUTIC EXERCISES: CPT

## 2021-06-24 NOTE — FLOWSHEET NOTE
in sidelying 10x ea  B Elbow bent, knees/hips flexed to 90   Sleeper stretch 2x30\"       Manual resisted ER  2x10  A     sidelying ER rhythmic stabilization  3x20\" LAX ball          Prone       Neuro: scap retractions+lift  10x ea   Palms up  Palms down  H. abd   H. abd with ER  scaption (10x)- no lift     Row-> ER-> press 5xR 2# Unable to complete on the left due to clicking   H. abd rhythmic stabilziation 3x20\"  LAX ball          Sitting       B ER 2x10 lime          Standing       Serratus slides with foam roll 2x12 Blue  foam    scaption wall slides+lift 10x2 A    B shoulder press (banded) 10x lime    Pulsing shoulder flexion 5x lime banded   Wall ball circles 10x ea CW/CCW    ISO ER walk out-> shoulder press-> turn-> reverse 5x ea blue    B ER at 90/90 x blue Left shoulder fatigued         CORINA Shoulder Press x  Neutral and wide   Continues to have clicking with eccentric lowering-- reports of R scap not moving symmetrically           Treatment Charges: Mins Units   []  Modalities     [x]  Ther Exercise 20 1   [x]  Manual Therapy 10 1   []  Ther Activities     []  Aquatics     []  Vasocompression     [x]  Neuro 18 1   Total Treatment time 48 3       Assessment: [x] Progressing toward goals. Pt continues to present initially in the clinic with increased popping and clicking B shoulders which improved significantly at the end of the session. Pt with improvements in posterior capsular extensibility following graded mobilizations. Continued decrease in lower trap activation is appreciated, therefore, exercises targeting the lower trap were completed with good execution. Rhythmic stabilization in sidelying and prone is completed to promote scapular positioning and co-contraction of the shoulders with good technique, however, fatigue is noted. Continued difficulty is noted with prone scaption lifts, however, prone h. Abduction with ER is significantly improved from previous sessions.  Pt continues to be educated on the completion of scapular based exercises with low resistance to promote neuromuscular re-education prior to strengthening. [] No change. [] Other:  [x] Patient would continue to benefit from skilled physical therapy services in order to: improve pain, muscle-length tension relationships, scapular mobility and control, and functional strength in order to improve tolerance to overhead lifting and repeated lifting at work and in the gym. Problems: B shoulder pain    [x]? ? Pain: 5/10  [x]? ? ROM: audible clicking and posterior shoulder pain with all shoulder ROM; decreased posterior shoulder/capsule extensibility  [x]? ? Strength: decreased R shoulder strength compared to left; decreased muscle activation of B parascapular musculature   [x]? ? Function: UEFS: 96% max function  [x]? Other: forward head and rounded shoulder posturing         Short term goals: MEET IN 8 VISITS Status   Pain: Pt will report less than or equal to 2-3/10 B shoulder pain with a decrease in sharp pains by 50% in order to improve tolerance to completing overhead lifting tasks and strength training to maintain a physically active lifestyle and complete work specific tasks. - Pt will report ability to complete shoulder press and lat pulldown with light weight with a decrease in pain by 75% in order to be able to progress strength in the gym.  Ongoing   ROM: Pt will demonstrate a decrease in forward head and rounded shoulder posture to assist with a more retracted scapular position to assist with improvements in active shoulder movements when lifting.   - Flexion: 160 degrees without neck flexion  - IR at 90 (passive): 45 degrees B Ongoing   Strength: Pt will demonstrate 5/5 B shoulder strength in order to assist with shoulder stability when overhead lifting and completing work specific tasks.      - Pt will demonstrate improvements in scapular mobility without upper trap compensation to improve scapulohumeral control to assist with lifting at the gym and work. - Rhomboids: 5/5 B  - Lats (palms up/down): 5/5B  - Middle trap: 5/5 B  - Lower trap: 4/5 B Ongoing   Outcome Score: Pt will score greater than or equal to 100% on the UEFS in order to demonstrate improved function     Home Exercise Program: Pt will demonstrate independence with HEP. Ongoing                  Patient goals: \"Prevent further injury from weightlifting\"    Pt. Education:  [x] Yes  [] No  [] Reviewed Prior HEP/Ed  Method of Education: [x] Verbal  [] Demo  [] Written  6/18: Updated via [employer] (Access Code [email or web address])  Comprehension of Education:  [x] Verbalizes understanding. [x] Demonstrates understanding. [x] Needs review. [] Demonstrates/verbalizes HEP/Ed previously given. Plan: [x] Continue current frequency toward long and short term goals.     [x] Specific Instructions for subsequent treatments: See above      Time In: 706           Time Out: [address] [address]    Electronically signed by:  Anna Gutierrez, PT

## 2021-09-09 NOTE — DISCHARGE SUMMARY
[] Celsa Sullivan        Outpatient Physical                Therapy       [address]       Phone: [phone]       Fax: [phone] [x] [facility] at [address] [facility]       Phone: [phone]       Fax: [phone] [] [facility]     [address] [place] [address]     Phone: [phone]     Fax:  [phone]     Physical Therapy Discharge Note    Date: 2021      Patient: Kan Carolina  : 1995  MRN: [id number]: DEYSI Escobar CNP                                Insurance: [address] [place] [address] Dr Diagnosis: M25.511, G89.29, M25.512 (ICD-10-CM) - Chronic pain of both shoulders                   Rehab Codes: M25.511/M25.512, R29.3, M62.81  Onset Date:  (referral: 21)             Next 's appt: TBD   Visit# / total visits:                                     Cancels/No Shows: 0/1  Date of initial visit: 21                Date of final visit: 21       Discharge Status:     Pt failed to make additional appointments for therapy. Pt. Is now discharged. Electronically signed by: Gabe Sandoval PT    If you have any questions or concerns, please don't hesitate to call.   Thank you for your referral.